# Patient Record
Sex: FEMALE | Race: WHITE | Employment: FULL TIME | URBAN - METROPOLITAN AREA
[De-identification: names, ages, dates, MRNs, and addresses within clinical notes are randomized per-mention and may not be internally consistent; named-entity substitution may affect disease eponyms.]

---

## 2020-03-03 ENCOUNTER — TRANSFERRED RECORDS (OUTPATIENT)
Dept: HEALTH INFORMATION MANAGEMENT | Facility: CLINIC | Age: 55
End: 2020-03-03

## 2020-07-06 ENCOUNTER — TELEPHONE (OUTPATIENT)
Dept: OPHTHALMOLOGY | Facility: CLINIC | Age: 55
End: 2020-07-06

## 2020-07-06 ENCOUNTER — HOSPITAL ENCOUNTER (OUTPATIENT)
Facility: AMBULATORY SURGERY CENTER | Age: 55
End: 2020-07-06
Attending: OPHTHALMOLOGY | Admitting: OPHTHALMOLOGY
Payer: COMMERCIAL

## 2020-07-06 ENCOUNTER — PREP FOR PROCEDURE (OUTPATIENT)
Dept: OPHTHALMOLOGY | Facility: CLINIC | Age: 55
End: 2020-07-06

## 2020-07-06 DIAGNOSIS — H25.9 AGE-RELATED CATARACT OF BOTH EYES: Primary | ICD-10-CM

## 2020-07-06 DIAGNOSIS — Z11.59 ENCOUNTER FOR SCREENING FOR OTHER VIRAL DISEASES: Primary | ICD-10-CM

## 2020-07-06 DIAGNOSIS — H25.12 AGE-RELATED NUCLEAR CATARACT OF LEFT EYE: Primary | ICD-10-CM

## 2020-07-06 DIAGNOSIS — H25.9 AGE-RELATED CATARACT OF BOTH EYES: ICD-10-CM

## 2020-07-06 NOTE — TELEPHONE ENCOUNTER
Patient sees Dr. Meza at his [place] office, but they are out of network there. She needed to schedule surgery within the [facility] system. [facility] records received, and they patient stated that she already got her eye drops from them. She would still like to be called closer to the surgery with the instructions for using them.     Date Scheduled: 8-13 and 8-27  Facility: [facility]  Surgeon: Dr. Meza   Post-op appointment scheduled:   Next 5 appointments (look out 90 days)    Aug 17, 2020  8:45 AM CDT  [facility] Visit with Jurgen Meza MD  [facility] [address] [place] [id number] [phone]   Aug 31, 2020  9:15 AM CDT  Return Visit with Jurgen Meza MD  [facility]) [address] [place] [id number]  [phone]           scheduled?: No  Surgery packet/instructions confirmed received?  Yes, mailed  Special Considerations:   Kaylie Mcdowell, Surgery Scheduling Coordinator

## 2020-08-05 RX ORDER — SERTRALINE HYDROCHLORIDE 100 MG/1
100 TABLET, FILM COATED ORAL DAILY
COMMUNITY

## 2020-08-05 RX ORDER — LORATADINE 10 MG/1
10 TABLET ORAL DAILY
COMMUNITY

## 2020-08-05 RX ORDER — MONTELUKAST SODIUM 10 MG/1
10 TABLET ORAL AT BEDTIME
COMMUNITY

## 2020-08-11 ENCOUNTER — ANESTHESIA EVENT (OUTPATIENT)
Dept: SURGERY | Facility: AMBULATORY SURGERY CENTER | Age: 55
End: 2020-08-11

## 2020-08-11 DIAGNOSIS — Z11.59 ENCOUNTER FOR SCREENING FOR OTHER VIRAL DISEASES: ICD-10-CM

## 2020-08-11 PROCEDURE — U0003 INFECTIOUS AGENT DETECTION BY NUCLEIC ACID (DNA OR RNA); SEVERE ACUTE RESPIRATORY SYNDROME CORONAVIRUS 2 (SARS-COV-2) (CORONAVIRUS DISEASE [COVID-19]), AMPLIFIED PROBE TECHNIQUE, MAKING USE OF HIGH THROUGHPUT TECHNOLOGIES AS DESCRIBED BY CMS-2020-01-R: HCPCS | Performed by: OPHTHALMOLOGY

## 2020-08-11 NOTE — TELEPHONE ENCOUNTER
Phone call to pt who states she has started eye drops in the right eye this morning, in preparation for cataract surgery on Thursday. Discussed drop use and schedule.  Pt states she has no further questions at this time. Ashley Stotu RN

## 2020-08-12 LAB
SARS-COV-2 RNA SPEC QL NAA+PROBE: NOT DETECTED
SPECIMEN SOURCE: NORMAL

## 2020-08-12 ASSESSMENT — MIFFLIN-ST. JEOR: SCORE: 1537.55

## 2020-08-12 NOTE — ANESTHESIA PREPROCEDURE EVALUATION
"Anesthesia Pre-Procedure Evaluation    Patient: Fabiola Hector   MRN:     [id number] Gender:   female   Age:    54 year old :      1965        Preoperative Diagnosis: Age-related cataract of both eyes [H25.9]   Procedure(s):  RIGHT PHACOEMULSIFICATION, CATARACT, WITH STANDARD INTRAOCULAR LENS IMPLANT INSERTION     LABS:  CBC: No results found for: WBC, HGB, HCT, PLT  BMP: No results found for: NA, POTASSIUM, CHLORIDE, CO2, BUN, CR, GLC  COAGS: No results found for: PTT, INR, FIBR  POC: No results found for: BGM, HCG, HCGS  OTHER: No results found for: PH, LACT, A1C, KIMMIE, PHOS, MAG, ALBUMIN, PROTTOTAL, ALT, AST, GGT, ALKPHOS, BILITOTAL, BILIDIRECT, LIPASE, AMYLASE, RENU, TSH, T4, T3, CRP, SED     Preop Vitals    BP Readings from Last 3 Encounters:   No data found for BP    Pulse Readings from Last 3 Encounters:   No data found for Pulse      Resp Readings from Last 3 Encounters:   No data found for Resp    SpO2 Readings from Last 3 Encounters:   No data found for SpO2      Temp Readings from Last 1 Encounters:   No data found for Temp    Ht Readings from Last 1 Encounters:   20 1.626 m (5' 4\")      Wt Readings from Last 1 Encounters:   20 95.3 kg (210 lb)    Estimated body mass index is 36.05 kg/m  as calculated from the following:    Height as of this encounter: 1.626 m (5' 4\").    Weight as of this encounter: 95.3 kg (210 lb).     LDA:        Past Medical History:   Diagnosis Date     Uncomplicated asthma       History reviewed. No pertinent surgical history.   Allergies   Allergen Reactions     Penicillins      Sulfa Drugs         Anesthesia Evaluation     . Pt has not had prior anesthetic            ROS/MED HX    ENT/Pulmonary:     (+)GAETANO risk factors obese, Intermittent asthma , . .    Neurologic:  - neg neurologic ROS     Cardiovascular:  - neg cardiovascular ROS       METS/Exercise Tolerance:  >4 METS   Hematologic:  - neg hematologic  ROS       Musculoskeletal:  - neg " musculoskeletal ROS       GI/Hepatic:  - neg GI/hepatic ROS       Renal/Genitourinary:  - ROS Renal section negative       Endo:  - neg endo ROS       Psychiatric:  - neg psychiatric ROS       Infectious Disease:  - neg infectious disease ROS       Malignancy:      - no malignancy   Other: Comment: Cataract                        PHYSICAL EXAM:   Mental Status/Neuro: A/A/O   Airway: Facies: Thick Neck  Mallampati: III  Mouth/Opening: Full  TM distance: > 6 cm  Neck ROM: Full   Respiratory: Auscultation: CTAB     Resp. Rate: Normal     Resp. Effort: Normal      CV: Rhythm: Regular  Rate: Age appropriate  Heart: Normal Sounds  Edema: None   Comments:      Dental: Normal Dentition                Assessment:   ASA SCORE: 2    H&P: History and physical reviewed and following examination; no interval change.   Smoking Status:  Non-Smoker/Unknown   NPO Status: NPO Appropriate     Plan:   Anes. Type:  MAC   Pre-Medication: None   Induction:  IV (Standard)   Airway: Native Airway   Access/Monitoring: PIV   Maintenance: N/a     Postop Plan:   Postop Pain: None  Postop Sedation/Airway: Not planned  Disposition: Outpatient     PONV Management:   Adult Risk Factors: Female, Non-Smoker   Prevention: Ondansetron     CONSENT: Direct conversation   Plan and risks discussed with: Patient   Blood Products: N/a       Comments for Plan/Consent:  MAC, sedation, GA as back up, standard ASA monitors  All pertinent results and records reviewed, risks, included but not limited to hypoventilation, hypoxemia, laryngo/bronchospasm, N/V, intraoperative awareness due to sedation only d/w patient, all questions, concerns addressed                 Edgar Veliz MD

## 2020-08-13 ENCOUNTER — HOSPITAL ENCOUNTER (OUTPATIENT)
Facility: AMBULATORY SURGERY CENTER | Age: 55
Discharge: HOME | End: 2020-08-13
Attending: OPHTHALMOLOGY | Admitting: OPHTHALMOLOGY
Payer: COMMERCIAL

## 2020-08-13 ENCOUNTER — ANESTHESIA (OUTPATIENT)
Dept: SURGERY | Facility: AMBULATORY SURGERY CENTER | Age: 55
End: 2020-08-13
Payer: COMMERCIAL

## 2020-08-13 ENCOUNTER — SURGERY (OUTPATIENT)
Age: 55
End: 2020-08-13
Payer: COMMERCIAL

## 2020-08-13 VITALS
TEMPERATURE: 97 F | OXYGEN SATURATION: 96 % | WEIGHT: 210 LBS | DIASTOLIC BLOOD PRESSURE: 59 MMHG | RESPIRATION RATE: 16 BRPM | HEIGHT: 64 IN | SYSTOLIC BLOOD PRESSURE: 130 MMHG | BODY MASS INDEX: 35.85 KG/M2

## 2020-08-13 DIAGNOSIS — H25.11 AGE-RELATED NUCLEAR CATARACT OF RIGHT EYE: Primary | ICD-10-CM

## 2020-08-13 DIAGNOSIS — H25.9 AGE-RELATED CATARACT OF BOTH EYES: ICD-10-CM

## 2020-08-13 PROCEDURE — G8907 PT DOC NO EVENTS ON DISCHARG: HCPCS

## 2020-08-13 PROCEDURE — 67005 PARTIAL REMOVAL OF EYE FLUID: CPT | Mod: RT | Performed by: OPHTHALMOLOGY

## 2020-08-13 PROCEDURE — 66850 REMOVAL OF LENS MATERIAL: CPT | Mod: RT

## 2020-08-13 PROCEDURE — G8918 PT W/O PREOP ORDER IV AB PRO: HCPCS

## 2020-08-13 PROCEDURE — 67005 PARTIAL REMOVAL OF EYE FLUID: CPT | Mod: RT

## 2020-08-13 RX ORDER — MOXIFLOXACIN 5 MG/ML
1 SOLUTION/ DROPS OPHTHALMIC
Status: COMPLETED | OUTPATIENT
Start: 2020-08-13 | End: 2020-08-13

## 2020-08-13 RX ORDER — LIDOCAINE 40 MG/G
CREAM TOPICAL
Status: DISCONTINUED | OUTPATIENT
Start: 2020-08-13 | End: 2020-08-14 | Stop reason: HOSPADM

## 2020-08-13 RX ORDER — KETAMINE HYDROCHLORIDE 10 MG/ML
INJECTION, SOLUTION INTRAMUSCULAR; INTRAVENOUS PRN
Status: DISCONTINUED | OUTPATIENT
Start: 2020-08-13 | End: 2020-08-13

## 2020-08-13 RX ORDER — ONDANSETRON 4 MG/1
4 TABLET, ORALLY DISINTEGRATING ORAL EVERY 30 MIN PRN
Status: DISCONTINUED | OUTPATIENT
Start: 2020-08-13 | End: 2020-08-14 | Stop reason: HOSPADM

## 2020-08-13 RX ORDER — PROPARACAINE HYDROCHLORIDE 5 MG/ML
1 SOLUTION/ DROPS OPHTHALMIC
Status: COMPLETED | OUTPATIENT
Start: 2020-08-13 | End: 2020-08-13

## 2020-08-13 RX ORDER — ONDANSETRON 2 MG/ML
4 INJECTION INTRAMUSCULAR; INTRAVENOUS EVERY 30 MIN PRN
Status: DISCONTINUED | OUTPATIENT
Start: 2020-08-13 | End: 2020-08-14 | Stop reason: HOSPADM

## 2020-08-13 RX ORDER — SODIUM CHLORIDE, SODIUM LACTATE, POTASSIUM CHLORIDE, CALCIUM CHLORIDE 600; 310; 30; 20 MG/100ML; MG/100ML; MG/100ML; MG/100ML
INJECTION, SOLUTION INTRAVENOUS CONTINUOUS
Status: DISCONTINUED | OUTPATIENT
Start: 2020-08-13 | End: 2020-08-14 | Stop reason: HOSPADM

## 2020-08-13 RX ORDER — SODIUM CHLORIDE, SODIUM LACTATE, POTASSIUM CHLORIDE, CALCIUM CHLORIDE 600; 310; 30; 20 MG/100ML; MG/100ML; MG/100ML; MG/100ML
500 INJECTION, SOLUTION INTRAVENOUS CONTINUOUS
Status: DISCONTINUED | OUTPATIENT
Start: 2020-08-13 | End: 2020-08-14 | Stop reason: HOSPADM

## 2020-08-13 RX ORDER — MOXIFLOXACIN 5 MG/ML
SOLUTION/ DROPS OPHTHALMIC PRN
Status: DISCONTINUED | OUTPATIENT
Start: 2020-08-13 | End: 2020-08-13 | Stop reason: HOSPADM

## 2020-08-13 RX ORDER — ACETAMINOPHEN 325 MG/1
975 TABLET ORAL ONCE
Status: COMPLETED | OUTPATIENT
Start: 2020-08-13 | End: 2020-08-13

## 2020-08-13 RX ORDER — TETRACAINE HYDROCHLORIDE 5 MG/ML
SOLUTION OPHTHALMIC PRN
Status: DISCONTINUED | OUTPATIENT
Start: 2020-08-13 | End: 2020-08-13 | Stop reason: HOSPADM

## 2020-08-13 RX ORDER — KETOROLAC TROMETHAMINE 5 MG/ML
1 SOLUTION OPHTHALMIC
Status: COMPLETED | OUTPATIENT
Start: 2020-08-13 | End: 2020-08-13

## 2020-08-13 RX ORDER — CYCLOPENTOLATE HYDROCHLORIDE 10 MG/ML
1 SOLUTION/ DROPS OPHTHALMIC
Status: COMPLETED | OUTPATIENT
Start: 2020-08-13 | End: 2020-08-13

## 2020-08-13 RX ORDER — NALOXONE HYDROCHLORIDE 0.4 MG/ML
.1-.4 INJECTION, SOLUTION INTRAMUSCULAR; INTRAVENOUS; SUBCUTANEOUS
Status: DISCONTINUED | OUTPATIENT
Start: 2020-08-13 | End: 2020-08-14 | Stop reason: HOSPADM

## 2020-08-13 RX ORDER — PHENYLEPHRINE HYDROCHLORIDE 25 MG/ML
1 SOLUTION/ DROPS OPHTHALMIC
Status: COMPLETED | OUTPATIENT
Start: 2020-08-13 | End: 2020-08-13

## 2020-08-13 RX ORDER — MEPERIDINE HYDROCHLORIDE 25 MG/ML
12.5 INJECTION INTRAMUSCULAR; INTRAVENOUS; SUBCUTANEOUS
Status: DISCONTINUED | OUTPATIENT
Start: 2020-08-13 | End: 2020-08-14 | Stop reason: HOSPADM

## 2020-08-13 RX ADMIN — CYCLOPENTOLATE HYDROCHLORIDE 1 DROP: 10 SOLUTION/ DROPS OPHTHALMIC at 07:54

## 2020-08-13 RX ADMIN — Medication 250 ML: at 08:53

## 2020-08-13 RX ADMIN — CYCLOPENTOLATE HYDROCHLORIDE 1 DROP: 10 SOLUTION/ DROPS OPHTHALMIC at 07:43

## 2020-08-13 RX ADMIN — ACETAMINOPHEN 975 MG: 325 TABLET ORAL at 07:42

## 2020-08-13 RX ADMIN — PHENYLEPHRINE HYDROCHLORIDE 1 DROP: 25 SOLUTION/ DROPS OPHTHALMIC at 07:43

## 2020-08-13 RX ADMIN — PROPARACAINE HYDROCHLORIDE 1 DROP: 5 SOLUTION/ DROPS OPHTHALMIC at 07:43

## 2020-08-13 RX ADMIN — MOXIFLOXACIN 1 DROP: 5 SOLUTION/ DROPS OPHTHALMIC at 07:49

## 2020-08-13 RX ADMIN — KETOROLAC TROMETHAMINE 1 DROP: 5 SOLUTION OPHTHALMIC at 07:43

## 2020-08-13 RX ADMIN — CYCLOPENTOLATE HYDROCHLORIDE 1 DROP: 10 SOLUTION/ DROPS OPHTHALMIC at 07:49

## 2020-08-13 RX ADMIN — KETOROLAC TROMETHAMINE 1 DROP: 5 SOLUTION OPHTHALMIC at 07:54

## 2020-08-13 RX ADMIN — PHENYLEPHRINE HYDROCHLORIDE 1 DROP: 25 SOLUTION/ DROPS OPHTHALMIC at 07:54

## 2020-08-13 RX ADMIN — PHENYLEPHRINE HYDROCHLORIDE 1 DROP: 25 SOLUTION/ DROPS OPHTHALMIC at 07:49

## 2020-08-13 RX ADMIN — MOXIFLOXACIN 1 DROP: 5 SOLUTION/ DROPS OPHTHALMIC at 08:54

## 2020-08-13 RX ADMIN — KETOROLAC TROMETHAMINE 1 DROP: 5 SOLUTION OPHTHALMIC at 07:48

## 2020-08-13 RX ADMIN — Medication 2 DROP: at 08:54

## 2020-08-13 RX ADMIN — SODIUM CHLORIDE, SODIUM LACTATE, POTASSIUM CHLORIDE, CALCIUM CHLORIDE 500 ML: 600; 310; 30; 20 INJECTION, SOLUTION INTRAVENOUS at 07:47

## 2020-08-13 RX ADMIN — TETRACAINE HYDROCHLORIDE 2 DROP: 5 SOLUTION OPHTHALMIC at 08:54

## 2020-08-13 RX ADMIN — MOXIFLOXACIN 1 DROP: 5 SOLUTION/ DROPS OPHTHALMIC at 07:54

## 2020-08-13 RX ADMIN — MOXIFLOXACIN 1 DROP: 5 SOLUTION/ DROPS OPHTHALMIC at 07:43

## 2020-08-13 RX ADMIN — KETAMINE HYDROCHLORIDE 10 MG: 10 INJECTION, SOLUTION INTRAMUSCULAR; INTRAVENOUS at 08:41

## 2020-08-13 NOTE — DISCHARGE INSTRUCTIONS
[facility]   Adult Discharge Orders & Instructions     For 24 hours after surgery    1. Get plenty of rest.  A responsible adult must stay with you for at least 24 hours after you leave the hospital.   2. Do not drive or use heavy equipment.  If you have weakness or tingling, don't drive or use heavy equipment until this feeling goes away.  3. Do not drink alcohol.  4. Avoid strenuous or risky activities.  Ask for help when climbing stairs.   5. You may feel lightheaded.  IF so, sit for a few minutes before standing.  Have someone help you get up.   6. If you have nausea (feel sick to your stomach): Drink only clear liquids such as apple juice, ginger ale, broth or 7-Up.  Rest may also help.  Be sure to drink enough fluids.  Move to a regular diet as you feel able.  7. You may have a slight fever. Call the doctor if your fever is over 100 F (37.7 C) (taken under the tongue) or lasts longer than 24 hours.  8. You may have a dry mouth, a sore throat, muscle aches or trouble sleeping.  These should go away after 24 hours.  9. Do not make important or legal decisions.     Call your doctor for any of the followin.  Signs of infection (fever, growing tenderness at the surgery site, a large amount of drainage or bleeding, severe pain, foul-smelling drainage, redness, swelling).    2. It has been over 8 to 10 hours since surgery and you are still not able to urinate (pass water).    3.  Headache for over 24 hours.    4.  Numbness, tingling or weakness the day after surgery (if you had spinal anesthesia).                  5. Signs of Covid-19 infection (temperature over 100 degrees, shortness of breath, cough, loss of taste/smell, generalized body aches, persistent headache,                  chills, sore throat, nausea/vomiting/diarrhea).    Tylenol was given at 7:40am.  Next dose of Tylenol after 1:40pm.              Fabiola Alanis    Cataract Surgery Postoperative  Instructions    Postoperative Medications: After surgery, you will use several different eye drop  medications. In most cases you will start these eye drops 2 days before surgery.    1. Ocuflox - is an antibiotic drop that is used to minimize the risk of infection. It should be used 4 times daily for 10 days total or until you are told to discontinue.    (Acceptable alternatives to Ocuflox include: Zymaxid, Besivance, Gatafloxacin, Vigamox)     2.  Ketorolac - is an anti-inflammatory drop. Use it 4 time daily for 21 days total or until you are told to discontinue.  (Acceptable alternatives to Ketorolac include: Acuvail, Nevenac, Xibrom)    3. Prednisilone - is a steroid eye drop, used to minimize inflammation and modulate  healing. It should be used 4 times daily for 21 days total or until you are told to discontinue.  (Acceptable alternatives for Prednisilone include: Pred Forte, Omnipred, Econopred)      IF YOU GET AN ALTERNATIVE EYE DROP PLEASE FOLLOW THE DIRECTIONS ON THE BOTTLE OF DROPS. THEY WILL BE DIFFERENT!      The drops might sting a little when they are instilled, and that is normal.    It doesn t matter what order you put the drops into your eyes, but you should wait at least one minute between drops.    Please continue any glaucoma, dry eye, or other medications you were using prior to the surgery.    Please allow 24 to 48 hours when requesting refills, and call BEFORE you run out of drops.      Artificial Tears - are lubricating drops used to moisturize the eye. You can use these as much as you want, particularly if your eyes feel watery, gritty, or uncomfortable. Chilling these drops in the refrigerator results in a more soothing feeling. There are several brands of artificial tears available including, but not limited to: Optive, Refresh, Systane, Blink, Genteal, Soothe, and others. You should not use drops that  get the red out . You do not need a prescription for these  medications.      Restriction on Activities - It is extremely important that you DO NOT RUB THE  TREATED EYE.  - You will be given a clear plastic shield to wear as protection over your eye the  night after surgery.  - Refrain from any activities that may put your eye at risk of injury, as well as areas  containing a high volume of chemicals, dust, and debris.  - Do Not wear any eye makeup or moisturizer around the eye for 1 week after  surgery.  - Do Not swim or go into a hot-tub, Jacuzzi, or sauna for 1 week after surgery. You  can take showers as normal, but avoid getting shampoo or soap in your eyes.  - Avoid strenuous activity, including lifting more than 30 lbs, for 1 week after  surgery.  - It is fine to bathe, read, watch TV, and use the computer.  Symptoms requiring medical attention:  - Sudden onset of increased discharge from the eye  - Persistent or increasing pain in the eye  - Sudden decrease in vision  - Persistent nausea or vomiting    If you have any questions or concerns before or after your surgery, please contact:    Dr. Meza s office at [phone]

## 2020-08-13 NOTE — ANESTHESIA CARE TRANSFER NOTE
Patient: Fabiola Alanis    Procedure(s):  RIGHT PHACOEMULSIFICATION, CATARACT, Anterior Vitrectomy    Diagnosis: Age-related cataract of both eyes [H25.9]  Diagnosis Additional Information: No value filed.    Anesthesia Type:   MAC     Note:  Airway :Room Air  Patient transferred to:Phase II  Handoff Report: Identifed the Patient, Identified the Reponsible Provider, Reviewed the pertinent medical history, Discussed the surgical course, Reviewed Intra-OP anesthesia mangement and issues during anesthesia, Set expectations for post-procedure period and Allowed opportunity for questions and acknowledgement of understanding      Vitals: (Last set prior to Anesthesia Care Transfer)    CRNA VITALS  8/13/2020 0852 - 8/13/2020 0935      8/13/2020             Pulse:  83    SpO2:  99 %                Electronically Signed By: VALERIE Shaffer CRNA  August 13, 2020  9:35 AM

## 2020-08-13 NOTE — OP NOTE
PATIENT NAME:  Fabiola Alanis    :  1965    PATIENT NUMBER:  [id number]    DATE OF SURGERY:  2020    SURGEON:  Jurgen Meza MD [facility].    PREOPERATIVE DIAGNOSIS:   1. Cataract right eye.  2. High myopia (-18.0) right eye.    POSTOPERATIVE DIAGNOSIS:  Same    PROCEDURE PERFORMED:    1. Phacoemulsification right eye.  2. Anterior vitrectomy right eye.    ANESTHESIA:  Topical/MAC    COMPLICATIONS:  Capsular tear.    PROCEDURE: Following adequate preoperative dilation the patient was given topical anesthesia consisting of Proparacaine.  The patient was brought to the operative suite where the eye was prepped and draped in the usual sterile fashion.  A lid speculum was applied. A super sharp blade was used to create a paracentesis, through which 1% preservative free Lidocaine was injected.  Visoelastic was then used to inflate the anterior chamber.  A biplanar incision at the clear cornea limbus was created with a keratome.  A continuous curvilinear capsulorrhexis was attempted.  When a lateral tear was noticed a can-opener technique was employeed.  The lens was hydrodissected and hydro delineated using BSS on a cannula.  The lens nucleus was removed using phacoemulsification.  All nuclear material was removed and then a posterior rent was observed.  Ocucoat was used to keep the vitreous posterior.  Remaining cortex was removed using irrigation and aspiration.  An anterior vitrectomy was performed.  Miochol was given and the pupil was noted to be round.  The patient was left aphakic (original plan was for a -2.0 AR40).  BSS was used to hydrate the corneal incision and paracentesis sites which were checked and noted to be watertight.  A drop of Vigamox was applied to the eye and a clear plastic shield was placed.  The patient tolerated the procedure well and left the operative suite in stable condition.  She is going to follow-up with me this afternoon in clinic.    Jurgen Meza,  M.D.

## 2020-08-13 NOTE — ANESTHESIA POSTPROCEDURE EVALUATION
Anesthesia POST Procedure Evaluation    Patient: Fabiola Alanis   MRN:     [id number] Gender:   female   Age:    54 year old :      1965        Preoperative Diagnosis: Age-related cataract of both eyes [H25.9]   Procedure(s):  RIGHT PHACOEMULSIFICATION, CATARACT, Anterior Vitrectomy   Postop Comments: No value filed.     Anesthesia Type: MAC       Disposition: Outpatient   Postop Pain Control: Uneventful            Sign Out: Well controlled pain   PONV: No   Neuro/Psych: Uneventful            Sign Out: Acceptable/Baseline neuro status   Airway/Respiratory: Uneventful            Sign Out: Acceptable/Baseline resp. status   CV/Hemodynamics: Uneventful            Sign Out: Acceptable CV status   Other NRE: NONE   DID A NON-ROUTINE EVENT OCCUR? No         Last Anesthesia Record Vitals:  CRNA VITALS  2020 0852 - 2020 0952      2020             Pulse:  83    SpO2:  99 %          Last PACU Vitals:  Vitals Value Taken Time   BP     Temp     Pulse     Resp     SpO2     Temp src Skin 2020  9:15 AM   NIBP 146/86 2020  9:19 AM   Pulse 83 2020  9:23 AM   SpO2 99 % 2020  9:23 AM   Resp     Temp     Ht Rate     Temp 2           Electronically Signed By: Edgar Veliz MD, 2020, 10:33 AM

## 2020-08-14 RX ORDER — PHENYLEPHRINE HYDROCHLORIDE 25 MG/ML
1 SOLUTION/ DROPS OPHTHALMIC
Status: CANCELLED | OUTPATIENT
Start: 2020-08-27

## 2020-08-14 RX ORDER — KETOROLAC TROMETHAMINE 5 MG/ML
1 SOLUTION OPHTHALMIC
Status: CANCELLED | OUTPATIENT
Start: 2020-08-27

## 2020-08-14 RX ORDER — CYCLOPENTOLATE HYDROCHLORIDE 10 MG/ML
1 SOLUTION/ DROPS OPHTHALMIC
Status: CANCELLED | OUTPATIENT
Start: 2020-08-27

## 2020-08-14 RX ORDER — PROPARACAINE HYDROCHLORIDE 5 MG/ML
1 SOLUTION/ DROPS OPHTHALMIC
Status: CANCELLED | OUTPATIENT
Start: 2020-08-27

## 2020-08-14 RX ORDER — MOXIFLOXACIN 5 MG/ML
1 SOLUTION/ DROPS OPHTHALMIC
Status: CANCELLED | OUTPATIENT
Start: 2020-08-27

## 2020-08-17 ENCOUNTER — OFFICE VISIT (OUTPATIENT)
Dept: OPHTHALMOLOGY | Facility: CLINIC | Age: 55
End: 2020-08-17
Payer: COMMERCIAL

## 2020-08-17 DIAGNOSIS — Z96.1 PSEUDOPHAKIA OF BOTH EYES: Primary | ICD-10-CM

## 2020-08-17 PROCEDURE — 99024 POSTOP FOLLOW-UP VISIT: CPT | Performed by: OPHTHALMOLOGY

## 2020-08-17 RX ORDER — KETOROLAC TROMETHAMINE 5 MG/ML
1 SOLUTION OPHTHALMIC 4 TIMES DAILY
COMMUNITY
Start: 2020-03-18 | End: 2020-09-21

## 2020-08-17 RX ORDER — PREDNISOLONE ACETATE 10 MG/ML
1 SUSPENSION/ DROPS OPHTHALMIC 4 TIMES DAILY
COMMUNITY
Start: 2020-03-18 | End: 2020-09-21

## 2020-08-17 RX ORDER — BRIMONIDINE TARTRATE AND TIMOLOL MALEATE 2; 5 MG/ML; MG/ML
1 SOLUTION OPHTHALMIC 2 TIMES DAILY
COMMUNITY
End: 2020-09-21

## 2020-08-17 RX ORDER — OFLOXACIN 3 MG/ML
1 SOLUTION/ DROPS OPHTHALMIC 4 TIMES DAILY
COMMUNITY
Start: 2020-03-18 | End: 2020-09-21

## 2020-08-17 ASSESSMENT — REFRACTION_MANIFEST
OD_AXIS: 140
OD_CYLINDER: +0.75
OD_SPHERE: -0.25

## 2020-08-17 ASSESSMENT — VISUAL ACUITY
OD_SC: 20/30
OS_CC: 20/80
METHOD: SNELLEN - LINEAR
OD_SC+: -2
CORRECTION_TYPE: CONTACTS

## 2020-08-17 ASSESSMENT — TONOMETRY
OD_IOP_MMHG: 6
IOP_METHOD: TONOPEN

## 2020-08-17 NOTE — NURSING NOTE
"Chief Complaints and History of Present Illnesses   Patient presents with     Post Op (Ophthalmology) Right Eye       Chief Complaint(s) and History of Present Illness(es)     Post Op (Ophthalmology) Right Eye     Laterality: right eye              Comments     S/p cataract surgery right eye 8/13/2020. Pt states she experiences a \"flutter\" in right eye, almost like a movement of the eye, not the eyelid. Did some reading yesterday and experienced some eye strain. Using drops as prescribed.                 Ronit Pathak, COA    "

## 2020-08-21 DIAGNOSIS — Z11.59 ENCOUNTER FOR SCREENING FOR OTHER VIRAL DISEASES: Primary | ICD-10-CM

## 2020-08-24 DIAGNOSIS — Z11.59 ENCOUNTER FOR SCREENING FOR OTHER VIRAL DISEASES: ICD-10-CM

## 2020-08-24 PROCEDURE — U0003 INFECTIOUS AGENT DETECTION BY NUCLEIC ACID (DNA OR RNA); SEVERE ACUTE RESPIRATORY SYNDROME CORONAVIRUS 2 (SARS-COV-2) (CORONAVIRUS DISEASE [COVID-19]), AMPLIFIED PROBE TECHNIQUE, MAKING USE OF HIGH THROUGHPUT TECHNOLOGIES AS DESCRIBED BY CMS-2020-01-R: HCPCS | Performed by: OPHTHALMOLOGY

## 2020-08-25 LAB
SARS-COV-2 RNA SPEC QL NAA+PROBE: NOT DETECTED
SPECIMEN SOURCE: NORMAL

## 2020-08-25 NOTE — PROGRESS NOTES
Assessment & Plan   Fabiola Alanis is a 54 year old female who presents with:   Review of systems for the eyes was negative other than the pertinent positives and negatives noted in the HPI.    Pseudophakia of both eyes  - Rx per MR    RTC 1 year      Attending Physician Attestation:  Complete documentation of historical and exam elements from today's encounter can be found in the full encounter summary report (not reduplicated in this progress note).  I personally obtained the chief complaint(s) and history of present illness.  I confirmed and edited as necessary the review of systems, past medical/surgical history, family history, social history, and examination findings as documented by others; and I examined the patient myself.  I personally reviewed the relevant tests, images, and reports as documented above.  I formulated and edited as necessary the assessment and plan and discussed the findings and management plan with the patient and family. - Jurgen Meza MD

## 2020-08-26 RX ORDER — PHENYLEPHRINE HYDROCHLORIDE 25 MG/ML
1 SOLUTION/ DROPS OPHTHALMIC
Status: COMPLETED | OUTPATIENT
Start: 2020-08-27 | End: 2020-08-27

## 2020-08-26 RX ORDER — KETOROLAC TROMETHAMINE 5 MG/ML
1 SOLUTION OPHTHALMIC
Status: COMPLETED | OUTPATIENT
Start: 2020-08-27 | End: 2020-08-27

## 2020-08-26 RX ORDER — MOXIFLOXACIN 5 MG/ML
1 SOLUTION/ DROPS OPHTHALMIC
Status: COMPLETED | OUTPATIENT
Start: 2020-08-27 | End: 2020-08-27

## 2020-08-26 RX ORDER — CYCLOPENTOLATE HYDROCHLORIDE 10 MG/ML
1 SOLUTION/ DROPS OPHTHALMIC
Status: COMPLETED | OUTPATIENT
Start: 2020-08-27 | End: 2020-08-27

## 2020-08-26 RX ORDER — PROPARACAINE HYDROCHLORIDE 5 MG/ML
1 SOLUTION/ DROPS OPHTHALMIC
Status: COMPLETED | OUTPATIENT
Start: 2020-08-27 | End: 2020-08-27

## 2020-08-26 NOTE — TELEPHONE ENCOUNTER
Phone call to pt who states she started eye drops in the left eye in preparation for cataract surgery on Thursday. She states she has no further questions at this time.  Ashley Stout RN

## 2020-08-27 ENCOUNTER — SURGERY (OUTPATIENT)
Age: 55
End: 2020-08-27
Payer: COMMERCIAL

## 2020-08-27 ENCOUNTER — ANESTHESIA (OUTPATIENT)
Dept: SURGERY | Facility: AMBULATORY SURGERY CENTER | Age: 55
End: 2020-08-27
Payer: COMMERCIAL

## 2020-08-27 ENCOUNTER — HOSPITAL ENCOUNTER (OUTPATIENT)
Facility: AMBULATORY SURGERY CENTER | Age: 55
Discharge: HOME | End: 2020-08-27
Attending: OPHTHALMOLOGY | Admitting: OPHTHALMOLOGY
Payer: COMMERCIAL

## 2020-08-27 ENCOUNTER — ANESTHESIA EVENT (OUTPATIENT)
Dept: SURGERY | Facility: AMBULATORY SURGERY CENTER | Age: 55
End: 2020-08-27

## 2020-08-27 VITALS
RESPIRATION RATE: 18 BRPM | TEMPERATURE: 97.5 F | OXYGEN SATURATION: 98 % | DIASTOLIC BLOOD PRESSURE: 85 MMHG | SYSTOLIC BLOOD PRESSURE: 142 MMHG

## 2020-08-27 DIAGNOSIS — H25.12 AGE-RELATED NUCLEAR CATARACT OF LEFT EYE: Primary | ICD-10-CM

## 2020-08-27 PROCEDURE — G8918 PT W/O PREOP ORDER IV AB PRO: HCPCS

## 2020-08-27 PROCEDURE — G8907 PT DOC NO EVENTS ON DISCHARG: HCPCS

## 2020-08-27 PROCEDURE — 66984 XCAPSL CTRC RMVL W/O ECP: CPT | Mod: 79 | Performed by: OPHTHALMOLOGY

## 2020-08-27 PROCEDURE — 66984 XCAPSL CTRC RMVL W/O ECP: CPT | Mod: LT

## 2020-08-27 RX ORDER — LIDOCAINE 40 MG/G
CREAM TOPICAL
Status: DISCONTINUED | OUTPATIENT
Start: 2020-08-27 | End: 2020-08-28 | Stop reason: HOSPADM

## 2020-08-27 RX ORDER — SODIUM CHLORIDE, SODIUM LACTATE, POTASSIUM CHLORIDE, CALCIUM CHLORIDE 600; 310; 30; 20 MG/100ML; MG/100ML; MG/100ML; MG/100ML
500 INJECTION, SOLUTION INTRAVENOUS CONTINUOUS
Status: DISCONTINUED | OUTPATIENT
Start: 2020-08-27 | End: 2020-08-28 | Stop reason: HOSPADM

## 2020-08-27 RX ORDER — TETRACAINE HYDROCHLORIDE 5 MG/ML
SOLUTION OPHTHALMIC PRN
Status: DISCONTINUED | OUTPATIENT
Start: 2020-08-27 | End: 2020-08-27 | Stop reason: HOSPADM

## 2020-08-27 RX ORDER — MOXIFLOXACIN 5 MG/ML
SOLUTION/ DROPS OPHTHALMIC PRN
Status: DISCONTINUED | OUTPATIENT
Start: 2020-08-27 | End: 2020-08-27 | Stop reason: HOSPADM

## 2020-08-27 RX ORDER — KETAMINE HYDROCHLORIDE 10 MG/ML
INJECTION, SOLUTION INTRAMUSCULAR; INTRAVENOUS PRN
Status: DISCONTINUED | OUTPATIENT
Start: 2020-08-27 | End: 2020-08-27

## 2020-08-27 RX ADMIN — CYCLOPENTOLATE HYDROCHLORIDE 1 DROP: 10 SOLUTION/ DROPS OPHTHALMIC at 06:28

## 2020-08-27 RX ADMIN — Medication 1 DROP: at 07:35

## 2020-08-27 RX ADMIN — TETRACAINE HYDROCHLORIDE 1 DROP: 5 SOLUTION OPHTHALMIC at 07:34

## 2020-08-27 RX ADMIN — KETOROLAC TROMETHAMINE 1 DROP: 5 SOLUTION OPHTHALMIC at 06:23

## 2020-08-27 RX ADMIN — MOXIFLOXACIN 1 DROP: 5 SOLUTION/ DROPS OPHTHALMIC at 06:23

## 2020-08-27 RX ADMIN — KETAMINE HYDROCHLORIDE 5 MG: 10 INJECTION, SOLUTION INTRAMUSCULAR; INTRAVENOUS at 07:41

## 2020-08-27 RX ADMIN — KETAMINE HYDROCHLORIDE 10 MG: 10 INJECTION, SOLUTION INTRAMUSCULAR; INTRAVENOUS at 07:37

## 2020-08-27 RX ADMIN — Medication 250 ML: at 07:42

## 2020-08-27 RX ADMIN — MOXIFLOXACIN 1 DROP: 5 SOLUTION/ DROPS OPHTHALMIC at 06:28

## 2020-08-27 RX ADMIN — CYCLOPENTOLATE HYDROCHLORIDE 1 DROP: 10 SOLUTION/ DROPS OPHTHALMIC at 06:23

## 2020-08-27 RX ADMIN — KETOROLAC TROMETHAMINE 1 DROP: 5 SOLUTION OPHTHALMIC at 06:34

## 2020-08-27 RX ADMIN — PHENYLEPHRINE HYDROCHLORIDE 1 DROP: 25 SOLUTION/ DROPS OPHTHALMIC at 06:23

## 2020-08-27 RX ADMIN — PHENYLEPHRINE HYDROCHLORIDE 1 DROP: 25 SOLUTION/ DROPS OPHTHALMIC at 06:28

## 2020-08-27 RX ADMIN — SODIUM CHLORIDE, SODIUM LACTATE, POTASSIUM CHLORIDE, CALCIUM CHLORIDE 500 ML: 600; 310; 30; 20 INJECTION, SOLUTION INTRAVENOUS at 06:34

## 2020-08-27 RX ADMIN — PHENYLEPHRINE HYDROCHLORIDE 1 DROP: 25 SOLUTION/ DROPS OPHTHALMIC at 06:34

## 2020-08-27 RX ADMIN — PROPARACAINE HYDROCHLORIDE 1 DROP: 5 SOLUTION/ DROPS OPHTHALMIC at 06:22

## 2020-08-27 RX ADMIN — KETOROLAC TROMETHAMINE 1 DROP: 5 SOLUTION OPHTHALMIC at 06:28

## 2020-08-27 RX ADMIN — MOXIFLOXACIN 1 DROP: 5 SOLUTION/ DROPS OPHTHALMIC at 06:34

## 2020-08-27 RX ADMIN — MOXIFLOXACIN 1 DROP: 5 SOLUTION/ DROPS OPHTHALMIC at 07:45

## 2020-08-27 RX ADMIN — CYCLOPENTOLATE HYDROCHLORIDE 1 DROP: 10 SOLUTION/ DROPS OPHTHALMIC at 06:34

## 2020-08-27 NOTE — DISCHARGE INSTRUCTIONS
Fabiola Benignohennylivan Barrientosjose maria    Cataract Surgery Postoperative Instructions    Postoperative Medications: After surgery, you will use several different eye drop  medications. In most cases you will start these eye drops 2 days before surgery.    1. Ocuflox - is an antibiotic drop that is used to minimize the risk of infection. It should be used 4 times daily for 10 days total or until you are told to discontinue.    (Acceptable alternatives to Ocuflox include: Zymaxid, Besivance, Gatafloxacin, Vigamox)     2.  Ketorolac - is an anti-inflammatory drop. Use it 4 time daily for 21 days total or until you are told to discontinue.  (Acceptable alternatives to Ketorolac include: Acuvail, Nevenac, Xibrom)    3. Prednisilone - is a steroid eye drop, used to minimize inflammation and modulate  healing. It should be used 4 times daily for 21 days total or until you are told to discontinue.  (Acceptable alternatives for Prednisilone include: Pred Forte, Omnipred, Econopred)      IF YOU GET AN ALTERNATIVE EYE DROP PLEASE FOLLOW THE DIRECTIONS ON THE BOTTLE OF DROPS. THEY WILL BE DIFFERENT!      The drops might sting a little when they are instilled, and that is normal.    It doesn t matter what order you put the drops into your eyes, but you should wait at least one minute between drops.    Please continue any glaucoma, dry eye, or other medications you were using prior to the surgery.    Please allow 24 to 48 hours when requesting refills, and call BEFORE you run out of drops.      Artificial Tears - are lubricating drops used to moisturize the eye. You can use these as much as you want, particularly if your eyes feel watery, gritty, or uncomfortable. Chilling these drops in the refrigerator results in a more soothing feeling. There are several brands of artificial tears available including, but not limited to: Optive, Refresh, Systane, Blink, Genteal, Soothe, and others. You should not use drops that  get the red out .  You do not need a prescription for these medications.      Restriction on Activities - It is extremely important that you DO NOT RUB THE  TREATED EYE.  - You will be given a clear plastic shield to wear as protection over your eye the  night after surgery.  - Refrain from any activities that may put your eye at risk of injury, as well as areas  containing a high volume of chemicals, dust, and debris.  - Do Not wear any eye makeup or moisturizer around the eye for 1 week after  surgery.  - Do Not swim or go into a hot-tub, Jacuzzi, or sauna for 1 week after surgery. You  can take showers as normal, but avoid getting shampoo or soap in your eyes.  - Avoid strenuous activity, including lifting more than 30 lbs, for 1 week after  surgery.  - It is fine to bathe, read, watch TV, and use the computer.  Symptoms requiring medical attention:  - Sudden onset of increased discharge from the eye  - Persistent or increasing pain in the eye  - Sudden decrease in vision  - Persistent nausea or vomiting    If you have any questions or concerns before or after your surgery, please contact:    Dr. Meza s office at [phone]    [facility]  Same-Day Surgery   Adult Discharge Orders & Instructions   For 24 hours after surgery  1. Get plenty of rest.  A responsible adult must stay with you for at least 24 hours after you leave the hospital.   2. Do not drive or use heavy equipment.  If you have weakness or tingling, don't drive or use heavy equipment until this feeling goes away.  3. Do not drink alcohol.  4. Avoid strenuous or risky activities.  Ask for help when climbing stairs.   5. You may feel lightheaded.  IF so, sit for a few minutes before standing.  Have someone help you get up.   6. If you have nausea (feel sick to your stomach): Drink only clear liquids such as apple juice, ginger ale, broth or 7-Up.  Rest may also help.  Be sure to drink enough fluids.  Move to a regular diet as you feel  able.  7. You may have a slight fever. Call the doctor if your fever is over 100 F (37.7 C) (taken under the tongue) or lasts longer than 24 hours.  8. You may have a dry mouth, a sore throat, muscle aches or trouble sleeping.  These should go away after 24 hours.  9. Do not make important or legal decisions.   Call your doctor for any of the followin.  Signs of infection (fever, growing tenderness at the surgery site, a large amount of drainage or bleeding, severe pain, foul-smelling drainage, redness, swelling).    2. It has been over 8 to 10 hours since surgery and you are still not able to urinate (pass water).    3.  Headache for over 24 hours.    To contact Dr Sanderson call:  [phone]

## 2020-08-27 NOTE — ANESTHESIA PREPROCEDURE EVALUATION
"Anesthesia Pre-Procedure Evaluation    Patient: Fabiola Alanis   MRN:     [id number] Gender:   female   Age:    54 year old :      1965        Preoperative Diagnosis: Senile cataract of left eye, unspecified age-related cataract type [H25.9]   Procedure(s):  PHACOEMULSIFICATION, CATARACT, WITH STANDARD INTRAOCULAR LENS IMPLANT INSERTION, left     LABS:  CBC: No results found for: WBC, HGB, HCT, PLT  BMP: No results found for: NA, POTASSIUM, CHLORIDE, CO2, BUN, CR, GLC  COAGS: No results found for: PTT, INR, FIBR  POC: No results found for: BGM, HCG, HCGS  OTHER: No results found for: PH, LACT, A1C, KIMMIE, PHOS, MAG, ALBUMIN, PROTTOTAL, ALT, AST, GGT, ALKPHOS, BILITOTAL, BILIDIRECT, LIPASE, AMYLASE, RENU, TSH, T4, T3, CRP, SED     Preop Vitals    BP Readings from Last 3 Encounters:   20 136/71   20 130/59    Pulse Readings from Last 3 Encounters:   No data found for Pulse      Resp Readings from Last 3 Encounters:   20 18   20 16    SpO2 Readings from Last 3 Encounters:   20 98%   20 96%      Temp Readings from Last 1 Encounters:   20 35.5  C (95.9  F) (Temporal)    Ht Readings from Last 1 Encounters:   20 1.626 m (5' 4\")      Wt Readings from Last 1 Encounters:   20 95.3 kg (210 lb)    Estimated body mass index is 36.05 kg/m  as calculated from the following:    Height as of 20: 1.626 m (5' 4\").    Weight as of 20: 95.3 kg (210 lb).     LDA:  Peripheral IV 20 Left Hand (Active)   Number of days: 0        Past Medical History:   Diagnosis Date     Uncomplicated asthma       Past Surgical History:   Procedure Laterality Date     APPENDECTOMY       ENT SURGERY      sinus x 2     GYN SURGERY       c-sectionx 2     PHACOEMULSIFICATION WITH STANDARD INTRAOCULAR LENS IMPLANT Right 2020    Procedure: RIGHT PHACOEMULSIFICATION, CATARACT, Anterior Vitrectomy;  Surgeon: Jurgen Meza MD;  Location: [facility]     SOFT TISSUE " SURGERY Right     skin graft from rt thigh to lt ankle      Allergies   Allergen Reactions     Penicillins      Sulfa Drugs         Anesthesia Evaluation     .             ROS/MED HX    ENT/Pulmonary:  - neg pulmonary ROS   (+)asthma , . .    Neurologic:  - neg neurologic ROS     Cardiovascular:  - neg cardiovascular ROS       METS/Exercise Tolerance:     Hematologic:  - neg hematologic  ROS       Musculoskeletal:  - neg musculoskeletal ROS       GI/Hepatic:  - neg GI/hepatic ROS       Renal/Genitourinary:  - ROS Renal section negative       Endo:  - neg endo ROS       Psychiatric:  - neg psychiatric ROS       Infectious Disease:  - neg infectious disease ROS       Malignancy:      - no malignancy   Other:    - neg other ROS                     PHYSICAL EXAM:   Mental Status/Neuro: A/A/O   Airway: Facies: Feasible  Mallampati: I  Mouth/Opening: Full  TM distance: > 6 cm  Neck ROM: Full   Respiratory: Auscultation: CTAB     Resp. Rate: Normal     Resp. Effort: Normal      CV: Rhythm: Regular  Rate: Age appropriate  Heart: Normal Sounds  Edema: None   Comments:      Dental: Normal Dentition                Assessment:   ASA SCORE: 2    H&P: History and physical reviewed and following examination; no interval change.   Smoking Status:  Non-Smoker/Unknown   NPO Status: NPO Appropriate     Plan:   Anes. Type:  MAC   Pre-Medication: None   Induction:  N/a   Airway: Native Airway   Access/Monitoring: PIV   Maintenance: N/a     Postop Plan:   Postop Pain: None  Postop Sedation/Airway: Not planned  Disposition: Outpatient     PONV Management:   Adult Risk Factors: Female, Non-Smoker   Prevention: Ondansetron     CONSENT: Direct conversation   Plan and risks discussed with: Patient   Blood Products: Consent Deferred (Minimal Blood Loss)                   Flaquito May MD

## 2020-08-27 NOTE — ANESTHESIA CARE TRANSFER NOTE
Patient: Fabiola Alanis    Procedure(s):  PHACOEMULSIFICATION, CATARACT, WITH STANDARD INTRAOCULAR LENS IMPLANT INSERTION, left    Diagnosis: Senile cataract of left eye, unspecified age-related cataract type [H25.9]  Diagnosis Additional Information: No value filed.    Anesthesia Type:   MAC     Note:  Airway :Room Air  Patient transferred to:Phase II  Handoff Report: Identifed the Patient, Identified the Reponsible Provider, Reviewed the pertinent medical history, Discussed the surgical course, Reviewed Intra-OP anesthesia mangement and issues during anesthesia, Set expectations for post-procedure period and Allowed opportunity for questions and acknowledgement of understanding      Vitals: (Last set prior to Anesthesia Care Transfer)    CRNA VITALS  8/27/2020 0720 - 8/27/2020 0753      8/27/2020             Pulse:  89    SpO2:  97 %    Resp Rate (observed):  (!) 2                Electronically Signed By: VALERIE Morgan CRNA  August 27, 2020  7:53 AM

## 2020-08-27 NOTE — ANESTHESIA POSTPROCEDURE EVALUATION
Anesthesia POST Procedure Evaluation    Patient: Fabiola Alanis   MRN:     [id number] Gender:   female   Age:    54 year old :      1965        Preoperative Diagnosis: Senile cataract of left eye, unspecified age-related cataract type [H25.9]   Procedure(s):  PHACOEMULSIFICATION, CATARACT, WITH STANDARD INTRAOCULAR LENS IMPLANT INSERTION, left   Postop Comments: No value filed.     Anesthesia Type: MAC     JZG FV AN POST EVALUATION    Last Anesthesia Record Vitals:  CRNA VITALS  2020 0720 - 2020 0820      2020             Pulse:  89    SpO2:  97 %    Resp Rate (observed):  (!) 2          Last PACU Vitals:  Vitals Value Taken Time   /88 2020  7:51 AM   Temp 36.1  C (97  F) 2020  7:51 AM   Pulse     Resp 18 2020  7:51 AM   SpO2 95 % 2020  7:51 AM   Temp src Skin 2020  7:45 AM   NIBP 156/91 2020  7:46 AM   Pulse 89 2020  7:50 AM   SpO2 97 % 2020  7:50 AM   Resp     Temp 36.2  C (97.2  F) 2020  7:39 AM   Ht Rate     Temp 2           Electronically Signed By: Flaquito May MD, 2020, 1:00 PM

## 2020-08-27 NOTE — OP NOTE
PATIENT NAME:  Fabiola Alanis    :  1965    PATIENT NUMBER:  [id number]    DATE OF SURGERY:  2020    SURGEON:  Jurgen Meza MD, M.D.    PREOPERATIVE DIAGNOSIS: Cataract left eye.    POSTOPERATIVE DIAGNOSIS:  Same    PROCEDURE PERFORMED:    1. Phacoemulsification with posterior chamber intraocular lens left eye.    ANESTHESIA:  Topical/MAC    COMPLICATIONS:  None    PROCEDURE: Following adequate preoperative dilation the patient was given topical anesthesia consisting of Proparacaine.  The patient was brought to the operative suite where the eye was prepped and draped in the usual sterile fashion.  A lid speculum was applied. A super sharp blade was used to create a paracentesis, through which 1% preservative free Lidocaine was injected.  Visoelastic was then used to inflate the anterior chamber.  A biplanar incision at the clear cornea limbus was created with a keratome.  A continuous curvilinear capsulorrhexis was started with a cystitome and completed using Utrata forceps.  The lens was hydrodissected and hydro delineated using BSS on a cannula.  The lens nucleus was removed using phacoemulsification.  Remaining cortex was removed using irrigation and aspiration.  Viscoelastic was injected to inflate the capsular bag and a -1.5 (note minus power IOL) D [id number] IOLwas inserted into the capsular bag without difficulty.  Residual viscoelastic and provisc material was removed with irrigation and aspiration.  BSS was used to hydrate the corneal incision and paracentesis sites which were checked and noted to be watertight.  A drop of Vigamox was applied to the eye and a clear plastic shield was placed.  The patient tolerated the procedure well and left the operative suite in stable condition.    Jurgen Meza M.D.

## 2020-08-28 ENCOUNTER — TELEPHONE (OUTPATIENT)
Dept: OPHTHALMOLOGY | Facility: CLINIC | Age: 55
End: 2020-08-28

## 2020-08-28 NOTE — TELEPHONE ENCOUNTER
Returned phone call to pt who states she was outside in the sun when she noticed a white halo in her surgical eye.  She states she has had no worsening of vision, no redness and no pain in the eye.  She is now inside and states that halo has not progressed and she thought it was improving. Discussed that light sensitivity is not unusual in the postop period following lens replacement and that sunglasses are recommended when outside.  Advised pt that if symptoms worsen or if she has additional questions or concerns, to call clinic or the [facility] if on the weekend or after hours. Pt verbalized understanding of plan, she will return to clinic on Monday as scheduled.  Ashley Stout RN

## 2020-08-28 NOTE — TELEPHONE ENCOUNTER
[facility] Call Center    Phone Message    May a detailed message be left on voicemail: yes     Reason for Call: Other: pt had cateract surgery yesterday and now with in last hour she sees a white halo out of her surgery eye, please advise with her about this     Action Taken: Message routed to:  Adult Clinics: Eye p [id number]    Travel Screening: Not Applicable

## 2020-08-31 ENCOUNTER — OFFICE VISIT (OUTPATIENT)
Dept: OPHTHALMOLOGY | Facility: CLINIC | Age: 55
End: 2020-08-31
Payer: COMMERCIAL

## 2020-08-31 DIAGNOSIS — Z96.1 PSEUDOPHAKIA OF BOTH EYES: Primary | ICD-10-CM

## 2020-08-31 PROCEDURE — 99024 POSTOP FOLLOW-UP VISIT: CPT | Performed by: OPHTHALMOLOGY

## 2020-08-31 ASSESSMENT — VISUAL ACUITY
OD_SC: 20/25
OS_SC: 20/30
OS_SC+: +2
OD_SC+: +2
METHOD: SNELLEN - LINEAR

## 2020-08-31 ASSESSMENT — SLIT LAMP EXAM - LIDS
COMMENTS: NORMAL
COMMENTS: NORMAL

## 2020-08-31 ASSESSMENT — TONOMETRY
OD_IOP_MMHG: 14
OS_IOP_MMHG: 16
IOP_METHOD: ICARE

## 2020-08-31 ASSESSMENT — REFRACTION_MANIFEST
OS_AXIS: 040
OS_SPHERE: +0.75
OS_CYLINDER: +0.50

## 2020-08-31 NOTE — PROGRESS NOTES
Assessment & Plan   Fabiola Alanis is a 54 year old female who presents with:   Review of systems for the eyes was negative other than the pertinent positives and negatives noted in the HPI.    Pseudophakia of both eyes  - Looks great  - Continue drops  - RTC 2-3 wks for final MRx        Attending Physician Attestation:  Complete documentation of historical and exam elements from today's encounter can be found in the full encounter summary report (not reduplicated in this progress note).  I personally obtained the chief complaint(s) and history of present illness.  I confirmed and edited as necessary the review of systems, past medical/surgical history, family history, social history, and examination findings as documented by others; and I examined the patient myself.  I personally reviewed the relevant tests, images, and reports as documented above.  I formulated and edited as necessary the assessment and plan and discussed the findings and management plan with the patient and family. - Jurgen Meza MD

## 2020-08-31 NOTE — NURSING NOTE
Chief Complaints and History of Present Illnesses   Patient presents with     Post Op (Ophthalmology) Left Eye       Chief Complaint(s) and History of Present Illness(es)     Post Op (Ophthalmology) Left Eye     Laterality: left eye              Comments     S/p cataract surgery left eye 8/27/2020. Right eye done 8/13/2020. Noticing off and on halo around left eye, primarily when outside. No vision concerns.   Using drops as prescribed.                 Ronit Pathak, COA

## 2020-09-21 ENCOUNTER — OFFICE VISIT (OUTPATIENT)
Dept: OPHTHALMOLOGY | Facility: CLINIC | Age: 55
End: 2020-09-21
Payer: COMMERCIAL

## 2020-09-21 DIAGNOSIS — Z96.1 PSEUDOPHAKIA OF BOTH EYES: Primary | ICD-10-CM

## 2020-09-21 PROCEDURE — 99024 POSTOP FOLLOW-UP VISIT: CPT | Performed by: OPHTHALMOLOGY

## 2020-09-21 ASSESSMENT — TONOMETRY
OS_IOP_MMHG: 19
OD_IOP_MMHG: 18
IOP_METHOD: ICARE

## 2020-09-21 ASSESSMENT — VISUAL ACUITY
METHOD: SNELLEN - LINEAR
OS_SC: 20/20
OD_SC: 20/30
OS_SC+: -2

## 2020-09-21 ASSESSMENT — REFRACTION_MANIFEST
OD_SPHERE: -0.75
OD_CYLINDER: +0.75
OS_SPHERE: +0.25
OS_CYLINDER: +0.50
OS_AXIS: 005
OD_ADD: +2.50
OD_AXIS: 140
OS_ADD: +2.50

## 2020-09-21 ASSESSMENT — SLIT LAMP EXAM - LIDS
COMMENTS: NORMAL
COMMENTS: NORMAL

## 2020-09-21 ASSESSMENT — CONF VISUAL FIELD
OS_NORMAL: 1
METHOD: COUNTING FINGERS
OD_NORMAL: 1

## 2020-09-21 NOTE — NURSING NOTE
Chief Complaints and History of Present Illnesses   Patient presents with     Post Op (Ophthalmology) Both Eyes       Chief Complaint(s) and History of Present Illness(es)     Post Op (Ophthalmology) Both Eyes     Laterality: both eyes              Comments     S/p cataract surgery right eye 8/13/2020, left eye 8/27/2020. Happy with vision. Notes a little fluctuation of vision throughout the day.                Ronit Pathak, COA

## 2020-09-21 NOTE — PROGRESS NOTES
Assessment & Plan   Fabiola Alanis is a 54 year old female who presents with:   Review of systems for the eyes was negative other than the pertinent positives and negatives noted in the HPI.    Pseudophakia of both eyes  - Rx per MR (glasses)    Return in 12 months for annual exam.      Attending Physician Attestation:  Complete documentation of historical and exam elements from today's encounter can be found in the full encounter summary report (not reduplicated in this progress note).  I personally obtained the chief complaint(s) and history of present illness.  I confirmed and edited as necessary the review of systems, past medical/surgical history, family history, social history, and examination findings as documented by others; and I examined the patient myself.  I personally reviewed the relevant tests, images, and reports as documented above.  I formulated and edited as necessary the assessment and plan and discussed the findings and management plan with the patient and family. - Jurgen Meza MD

## 2020-09-29 ASSESSMENT — EXTERNAL EXAM - LEFT EYE: OS_EXAM: NORMAL

## 2020-09-29 ASSESSMENT — EXTERNAL EXAM - RIGHT EYE: OD_EXAM: NORMAL

## 2020-09-29 ASSESSMENT — SLIT LAMP EXAM - LIDS
COMMENTS: NORMAL
COMMENTS: NORMAL

## (undated) DEVICE — EYE PACK CUSTOM CATARACT AS12127-01

## (undated) DEVICE — SOL WATER IRRIG 1000ML BOTTLE 07139-09

## (undated) DEVICE — GLOVE PROTEXIS W/NEU-THERA 8.0  2D73TE80

## (undated) RX ORDER — LIDOCAINE HYDROCHLORIDE 20 MG/ML
INJECTION, SOLUTION EPIDURAL; INFILTRATION; INTRACAUDAL; PERINEURAL
Status: DISPENSED
Start: 2020-08-27

## (undated) RX ORDER — EPINEPHRINE 1 MG/ML
INJECTION, SOLUTION, CONCENTRATE INTRAVENOUS
Status: DISPENSED
Start: 2020-08-27

## (undated) RX ORDER — MOXIFLOXACIN 5 MG/ML
SOLUTION/ DROPS OPHTHALMIC
Status: DISPENSED
Start: 2020-08-27

## (undated) RX ORDER — ACETAMINOPHEN 325 MG/1
TABLET ORAL
Status: DISPENSED
Start: 2020-08-13

## (undated) RX ORDER — KETAMINE HYDROCHLORIDE 50 MG/ML
INJECTION, SOLUTION INTRAMUSCULAR; INTRAVENOUS
Status: DISPENSED
Start: 2020-08-27

## (undated) RX ORDER — TETRACAINE HYDROCHLORIDE 5 MG/ML
SOLUTION OPHTHALMIC
Status: DISPENSED
Start: 2020-08-27

## (undated) RX ORDER — BALANCED SALT SOLUTION 6.4; .75; .48; .3; 3.9; 1.7 MG/ML; MG/ML; MG/ML; MG/ML; MG/ML; MG/ML
SOLUTION OPHTHALMIC
Status: DISPENSED
Start: 2020-08-27

## (undated) RX ORDER — PROPARACAINE HYDROCHLORIDE 5 MG/ML
SOLUTION/ DROPS OPHTHALMIC
Status: DISPENSED
Start: 2020-08-13